# Patient Record
Sex: FEMALE | Race: BLACK OR AFRICAN AMERICAN | URBAN - METROPOLITAN AREA
[De-identification: names, ages, dates, MRNs, and addresses within clinical notes are randomized per-mention and may not be internally consistent; named-entity substitution may affect disease eponyms.]

---

## 2017-07-13 LAB
A-G RATIO,AGRAT: 1.1 RATIO (ref 1.1–2.6)
ALBUMIN SERPL-MCNC: 3.9 G/DL (ref 3.5–5)
ALP SERPL-CCNC: 105 U/L (ref 25–115)
ALT SERPL-CCNC: 16 U/L (ref 5–40)
ANION GAP SERPL CALC-SCNC: 15 MMOL/L
AST SERPL W P-5'-P-CCNC: 18 U/L (ref 10–37)
BILIRUB SERPL-MCNC: 0.3 MG/DL (ref 0.2–1.2)
BUN SERPL-MCNC: 8 MG/DL (ref 6–22)
CALCIUM SERPL-MCNC: 9.4 MG/DL (ref 8.4–10.5)
CHLORIDE SERPL-SCNC: 98 MMOL/L (ref 98–110)
CO2 SERPL-SCNC: 26 MMOL/L (ref 20–32)
CREAT SERPL-MCNC: 0.7 MG/DL (ref 0.5–1.2)
GFRAA, 66117: >60
GFRNA, 66118: >60
GLOBULIN,GLOB: 3.4 G/DL (ref 2–4)
GLUCOSE SERPL-MCNC: 95 MG/DL (ref 65–99)
IMMUNOGLOBULIN A, QN, SERUM, 001784: 356 MG/DL (ref 70–400)
IMMUNOGLOBULIN G, QN, SERUM, 001776: 1488 MG/DL (ref 700–1600)
IMMUNOGLOBULIN M, QN, SERUM, 001792: 127 MG/DL (ref 56–352)
LDH SERPL L TO P-CCNC: 249 U/L (ref 98–192)
PHOSPHATE SERPL-MCNC: 3.8 MG/DL (ref 2.4–4.7)
POTASSIUM SERPL-SCNC: 5 MMOL/L (ref 3.5–5.5)
PROT SERPL-MCNC: 7.3 G/DL (ref 6.4–8.3)
SODIUM SERPL-SCNC: 139 MMOL/L (ref 133–145)
URATE SERPL-MCNC: 6.6 MG/DL (ref 2.2–7.7)

## 2017-07-14 LAB
FREE KAPPA LT CHAINS,S, 121138: 33.3 MG/L
FREE LAMBDA LT CHAINS,S, 121139: 22.6 MG/L
KAPPA/LAMBDA RATIO,S, 121140: 1.47

## 2017-07-18 LAB
ALBUMIN, 001488: 46.7 % (ref 46.6–62.6)
ALBUMIN, 001488: 46.7 % (ref 46.6–62.6)
ALPHA-1-GLOBULIN, 001489: 2.7 % (ref 1.7–4.1)
ALPHA-1-GLOBULIN, 001489: 2.7 % (ref 1.7–4.1)
ALPHA-2-GLOBULIN, 001490: 11.3 % (ref 5.9–13.5)
ALPHA-2-GLOBULIN, 001490: 11.3 % (ref 5.9–13.5)
BETA GLOBULIN, 001491: 17 % (ref 10.9–18.9)
BETA GLOBULIN, 001491: 17 % (ref 10.9–18.9)
GAMMA GLOBULIN, 001492: 22.3 % (ref 11.6–24.4)
GAMMA GLOBULIN, 001492: 22.3 % (ref 11.6–24.4)
IMMUNOFIXATION RESULT, SERUM, 001795: NORMAL
PE INTERPRETATION, 107352: NORMAL
PROT SERPL-MCNC: 7.3 G/DL (ref 6.4–8.3)
PROT SERPL-MCNC: 7.3 G/DL (ref 6.4–8.3)

## 2019-03-21 ENCOUNTER — OFFICE VISIT (OUTPATIENT)
Dept: ONCOLOGY | Age: 59
End: 2019-03-21

## 2019-03-21 ENCOUNTER — HOSPITAL ENCOUNTER (OUTPATIENT)
Dept: ONCOLOGY | Age: 59
Discharge: HOME | End: 2019-03-21

## 2019-03-21 VITALS
TEMPERATURE: 98.2 F | HEIGHT: 64 IN | SYSTOLIC BLOOD PRESSURE: 112 MMHG | HEART RATE: 93 BPM | WEIGHT: 233 LBS | BODY MASS INDEX: 39.78 KG/M2 | OXYGEN SATURATION: 100 % | DIASTOLIC BLOOD PRESSURE: 74 MMHG | RESPIRATION RATE: 16 BRPM

## 2019-03-21 DIAGNOSIS — D89.2 HYPERGAMMAGLOBULINEMIA: ICD-10-CM

## 2019-03-21 DIAGNOSIS — D89.2 HYPERGAMMAGLOBULINEMIA: Primary | ICD-10-CM

## 2019-03-21 LAB
BASO+EOS+MONOS # BLD AUTO: 0.5 K/UL (ref 0–2.3)
BASO+EOS+MONOS NFR BLD AUTO: 10 % (ref 0.1–17)
DIFFERENTIAL METHOD BLD: NORMAL
ERYTHROCYTE [DISTWIDTH] IN BLOOD BY AUTOMATED COUNT: 13.2 % (ref 11.5–14.5)
HCT VFR BLD AUTO: 40.5 % (ref 36–48)
HGB BLD-MCNC: 13.2 G/DL (ref 12–16)
LYMPHOCYTES # BLD: 1.9 K/UL (ref 1.1–5.9)
LYMPHOCYTES NFR BLD: 42 % (ref 14–44)
MCH RBC QN AUTO: 27.1 PG (ref 25–35)
MCHC RBC AUTO-ENTMCNC: 32.6 G/DL (ref 31–37)
MCV RBC AUTO: 83.2 FL (ref 78–102)
NEUTS SEG # BLD: 2.2 K/UL (ref 1.8–9.5)
NEUTS SEG NFR BLD: 48 % (ref 40–70)
PLATELET # BLD AUTO: 254 K/UL (ref 140–440)
RBC # BLD AUTO: 4.87 M/UL (ref 4.1–5.1)
WBC # BLD AUTO: 4.6 K/UL (ref 4.5–13)

## 2019-03-21 RX ORDER — PHENTERMINE HYDROCHLORIDE 37.5 MG/1
37.5 CAPSULE ORAL
COMMUNITY

## 2019-03-21 RX ORDER — LOSARTAN POTASSIUM AND HYDROCHLOROTHIAZIDE 25; 100 MG/1; MG/1
1 TABLET ORAL DAILY
COMMUNITY

## 2019-03-21 NOTE — PROGRESS NOTES
Hematology/Oncology Consultation Note Name: Gayle Jauregui 
Date: 3/21/2019 : 1960 No primary care provider on file. Ms. Royal De La Rosa  is a 63 y.o. -American woman who is here for an assessment to rule out multiple myeloma. She has a strong positive family history of myeloma involving her father and brother. Subjective: Chief complaint: Anxiety over health. The patient has a strong positive family history for multiple myeloma. History of present illness: Ms. Royal De La Rosa is a 81-year-old -American woman who underwent an extensive evaluation in 2017 to rule out any evidence of malignancy. She had some bone pain with potential abnormal bone findings. Subsequently she had x-rays of her knee which showed some lytic lesions in both femurs with one lesion measuring 2.8 x 1.3 cm and it was described as a sclerotic lesion in the super lateral condyle. 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 On 2017 the patient underwent a whole-body bone scan which revealed discrete activity in the right femoral metaphysis corresponding to the sclerotic lesion on the prior right knee radiograph. A solitary sclerotic lesion on a bone scan was nonspecific. Subsequently on 2017 she had MRI of both knees which revealed subchondral cystic changes with edema and potential osteochondral fractures at the anterior distal femur. On the same day of CT scan of the chest abdomen pelvis showed some lytic lesions in the manubrium which were on clear as to the etiology. There was an enlarged left thyroid nodule measuring 2.1 cm which was subsequently biopsied and found to be negative for malignancy but to contain follicular cells, lymphocyte, and Hurthle cells but this was consistent with a lymphocytic thyroiditis.   An MRI was done of the chest on 2017 revealed a nonaggressive T1 lesion within the manubrium which was of unclear etiology. The imaging features were most consistent with hemangioma. On 10/5/2017 a CT-guided fine-needle aspiration of the manubrial lytic bone lesion was negative for malignancy. In 2013 the patient has undergone right thyroidectomy and on 9/29/2017 a biopsy of the left thyroid gland had been planned. On 10/5/2017 a CT-guided core biopsy of the right thyroid fossa mass was negative for malignancy. Temperature done on 10/5/2017 on aspiration from the left thyroid revealed no definitive immunophenotypic evidence of lymphoproliferative disorder. The patient reports that she does have a strong positive family history for multiple myeloma in her father and now in 3 of her brothers. She is concerned and therefore came here for additional testing to have rule out any evidence that she may be developing multiple myeloma or a pre-myeloma process. Past Medical History:  
Diagnosis Date  Gall bladder pain  Hypertension  Sinus problem  Wears dentures Allergies no known allergies Past Surgical History:  
Procedure Laterality Date  HX GYN    
 HX PARTIAL THYROIDECTOMY Social History Socioeconomic History  Marital status:  Spouse name: Not on file  Number of children: Not on file  Years of education: Not on file  Highest education level: Not on file Occupational History  Not on file Social Needs  Financial resource strain: Not on file  Food insecurity:  
  Worry: Not on file Inability: Not on file  Transportation needs:  
  Medical: Not on file Non-medical: Not on file Tobacco Use  Smoking status: Never Smoker  Smokeless tobacco: Never Used Substance and Sexual Activity  Alcohol use: Not Currently  Drug use: Never  Sexual activity: Not Currently Lifestyle  Physical activity:  
  Days per week: Not on file Minutes per session: Not on file  Stress: Not on file Relationships  Social connections:  
  Talks on phone: Not on file Gets together: Not on file Attends [employer] service: Not on file Active member of club or organization: Not on file Attends meetings of clubs or organizations: Not on file Relationship status: Not on file  Intimate partner violence:  
  Fear of current or ex partner: Not on file Emotionally abused: Not on file Physically abused: Not on file Forced sexual activity: Not on file Other Topics Concern  Not on file Social History Narrative  Not on file Family History Problem Relation Age of Onset  Cancer Mother   
     breast  
 Hypertension Mother  Cancer Father   
     multiple mylemoa  Heart Disease Father  Hypertension Father Current Outpatient Medications Medication Sig Dispense Refill  loratadine (CLARITIN PO) Take  by mouth.  soy isofla/blk cohosh/mag bark (ESTROVEN PO) Take  by mouth.  losartan-hydroCHLOROthiazide (HYZAAR) 100-25 mg per tablet Take 1 Tab by mouth daily.  phentermine 37.5 mg capsule Take 37.5 mg by mouth every morning. Review of Systems General ROS:The patient has no complaints and there is no physical distress evident. Psychological ROS: patient denies having any psychological symptoms such as hallucinations or depression. However she has a considerable degree of anxiety over health and is concerned that she may have multiple since its positive and her brother and father. Ophthalmic ROS:the patient denies having any visual impairment or eye discomfort. ENT ROS: there are no abnormalities reported. Allergy and Immunology ROS:the patient denies having any seasonal allergies or allergies to medications other than those already outlined above. Hematological and Lymphatic ROS: the patient denies having any bruising, bleeding or lymphadenopathy. Endocrine ROS: the patient denies having any heat or cold intolerance. There is no history of diabetes or thyroid disorders. Breast ROS: the patient denies having any history of breast mass, nipple discharge, or lumps. Respiratory ROS:the patient denies having any cough, shortness of breath, or dyspnea on exertion. Cardiovascular ROS: there are no complaints of chest pain, palpitations, chest pounding, or dyspnea on exertion. Gastrointestinal ROS: the patient denies having nausea, emesis, diarrhea, constipation, or blood in the stool. Genito-Urinary ROS: the patient denies having urinary urgency, frequency, or dysuria. Musculoskeletal ROS: with the exception of mild arthralgias the patient has no other musculoskeletal complaints. Neurological ROS: the patient denies having any numbness, tingling, or neurologic deficits. Dermatological ROS:patient denies having any unexplained rash, skin ulcerations, or hives. Objective:  
 
Visit Vitals /74 Pulse 93 Temp 98.2 °F (36.8 °C) (Oral) Resp 16 Ht 5' 3.75\" (1.619 m) Wt 105.7 kg (233 lb) SpO2 100% BMI 40.31 kg/m² ECOGPS=0; pain score=0/10 Physical Exam:  
Gen. Appearance: the patient is in no acute distress. Skin: There is no evidence of bruise or rash. HEENT: The head is normocephalic and atraumatic. The conjunctiva and sclera are clear. Pupils are equal, round, reactive to light, and accommodation. The extraocular movements are intact. ENT reveals no oral mucosal lesions or ulcerations. Neck: Supple without lymphadenopathy or thyromegaly. Lungs: Clear to auscultation and percussion; there are no wheezes or rhonchi. Heart: Regular rate and rhythm; there are no murmurs, gallops, or rubs. Abdomen: Bowel sounds are present and normal.  There is no guarding, tenderness, or hepatosplenomegaly. Extremities: There is no clubbing, cyanosis, or edema. Neurologic: There are no focal neurologic deficits. Lymphatics: There is no palpable peripheral lymphadenopathy. Lab data: Lab data with regards to x-ray results and biopsies are outlined in the above history and will not be repeated again here. Lab test from 6/30/2017 included a serum protein electrophoresis which revealed no evidence of a monoclonal paraprotein. Serum free light chain showed slight elevation in kappa light chains at 37 mg/dL with normal lambda light chains and a normal free kappa/lambda ratio. Quantitative immunoglobulins revealed an elevated IgG level of 1729 mg/dL. The IgA was normal at 374 mg/dL, and the IgM was normal at 131 mg/dL. Assessment:  
Multiple lytic bone lesions, the patient is concerned of plasma cell dyscrasia: I have explained to the patient that with her positive family history we will assess for any evidence of plasma cell dyscrasia. Plan:  
Multiple bone lesions on prior x-rays with negative biopsy: At this time I will order a compressive metabolic panel, CBC, SPEP, serum immunofixation, serum free light chain assay, and beta-2 microglobulin level. If there is any evidence of a monoclonal paraprotein level detected then the patient will be subjected to additional testing which may include a bone marrow biopsy and 24-hour urine collection. I will plan to see her back in clinic in about 2-3 weeks to review lab data and to discuss if additional tests will be recommended. Orders Placed This Encounter  METABOLIC PANEL, COMPREHENSIVE Standing Status:   Future Standing Expiration Date:   3/21/2020  FREE LIGHT CHAINS, KAPPA/LAMBDA, QT Standing Status:   Future Standing Expiration Date:   3/21/2020  IMMUNOGLOBULINS, G/A/M, QT. Standing Status:   Future Standing Expiration Date:   3/21/2020  SPEP Standing Status:   Future Standing Expiration Date:   3/21/2020  BETA-2 MICROGLOBULIN Standing Status:   Future Standing Expiration Date:   3/21/2020  IMMUNOELECTROPHORESIS (IMMUNOFIX.) Standing Status:   Future Standing Expiration Date:   3/21/2020  loratadine (CLARITIN PO) Sig: Take  by mouth.  soy isofla/blk cohosh/mag bark (ESTROVEN PO) Sig: Take  by mouth.  losartan-hydroCHLOROthiazide (HYZAAR) 100-25 mg per tablet Sig: Take 1 Tab by mouth daily.  phentermine 37.5 mg capsule Sig: Take 37.5 mg by mouth every morning. Irina Colin MD 
3/21/2019 Please note: This document has been produced using voice recognition software. Unrecognized errors in transcription may be present.

## 2019-03-21 NOTE — PATIENT INSTRUCTIONS
Learning About Abnormal Lab Results Your Care Instructions Your lab test result is only one piece of information about your health. Your doctor considers many things when looking at a test result. These things may include your symptoms, age, weight, physical exam, and family history. That's why it's important to talk to your doctor. He or she can give you a clear sense of what your result means for you. Before you do, you may also find it helpful to learn a little about lab results in general. 
What can help you understand your test result? Lab test results are expressed in different ways. A test result can be: · Positive when something is present. One example is the hormone that is a sign of pregnancy. · Negative when something isn't present. An example is a negative strep test. 
· Inside or outside of the reference range of what is most common for that test. A reference range is just a guide. It is set by testing large groups of healthy people. It's also possible for a test result to be false-positive or false-negative. · A false-positive result is one that appears to detect something when in fact it is not present. · A false-negative result is one that does not detect what is being tested for even though it is present. What if your result is different than the reference range? It is possible to have a result that is outside the reference range even though nothing is wrong with you. Your doctor may want to repeat the test or order another test to check. Sometimes certain things can affect your test results. Examples include: · Pregnancy. · A medicine you are taking. · Fasting or eating just before a test. 
· Smoking. · Being under stress. Making sense of your lab test involves more than just knowing the numbers. Your doctor can tell you what your test results mean for you and your health. Follow-up care is a key part of your treatment and safety.  Be sure to make and go to all appointments, and call your doctor if you are having problems. It's also a good idea to know your test results and keep a list of the medicines you take. Where can you learn more? Go to http://feliciano-minnie.info/. Enter F140 in the search box to learn more about \"Learning About Abnormal Lab Results. \" Current as of: June 25, 2018 Content Version: 11.9 © 3686-0491 [employer]. Care instructions adapted under license by [employer] (which disclaims liability or warranty for this information). If you have questions about a medical condition or this instruction, always ask your healthcare professional. [address] any warranty or liability for your use of this information.

## 2019-03-22 LAB
A-G RATIO,AGRAT: 1.3 RATIO (ref 1.1–2.6)
ALBUMIN SERPL-MCNC: 4.4 G/DL (ref 3.5–5)
ALBUMIN, 001488: 47.6 % (ref 46.6–62.6)
ALBUMIN, 001488: 47.6 % (ref 46.6–62.6)
ALP SERPL-CCNC: 136 U/L (ref 25–115)
ALPHA-1-GLOBULIN, 001489: 2.5 % (ref 1.7–4.1)
ALPHA-1-GLOBULIN, 001489: 2.5 % (ref 1.7–4.1)
ALPHA-2-GLOBULIN, 001490: 9.6 % (ref 5.9–13.5)
ALPHA-2-GLOBULIN, 001490: 9.6 % (ref 5.9–13.5)
ALT SERPL-CCNC: 18 U/L (ref 5–40)
ANION GAP SERPL CALC-SCNC: 14 MMOL/L
AST SERPL W P-5'-P-CCNC: 17 U/L (ref 10–37)
BETA GLOBULIN, 001491: 18.5 % (ref 10.9–18.9)
BETA GLOBULIN, 001491: 18.5 % (ref 10.9–18.9)
BILIRUB SERPL-MCNC: 0.2 MG/DL (ref 0.2–1.2)
BUN SERPL-MCNC: 12 MG/DL (ref 6–22)
CALCIUM SERPL-MCNC: 9.4 MG/DL (ref 8.4–10.5)
CHLORIDE SERPL-SCNC: 102 MMOL/L (ref 98–110)
CO2 SERPL-SCNC: 28 MMOL/L (ref 20–32)
CREAT SERPL-MCNC: 0.8 MG/DL (ref 0.5–1.2)
GAMMA GLOBULIN, 001492: 21.9 % (ref 11.6–24.4)
GAMMA GLOBULIN, 001492: 21.9 % (ref 11.6–24.4)
GFRAA, 66117: >60
GFRNA, 66118: >60
GLOBULIN,GLOB: 3.5 G/DL (ref 2–4)
GLUCOSE SERPL-MCNC: 81 MG/DL (ref 70–99)
IMMUNOFIXATION RESULT, SERUM, 001795: NORMAL
IMMUNOGLOBULIN A, QN, SERUM, 001784: 400 MG/DL (ref 70–400)
IMMUNOGLOBULIN G, QN, SERUM, 001776: 1565 MG/DL
IMMUNOGLOBULIN M, QN, SERUM, 001792: 143 MG/DL (ref 56–352)
PE INTERPRETATION, 107352: NORMAL
POTASSIUM SERPL-SCNC: 3.9 MMOL/L (ref 3.5–5.5)
PROT SERPL-MCNC: 7.2 G/DL (ref 6.4–8.3)
PROT SERPL-MCNC: 7.2 G/DL (ref 6.4–8.3)
PROT SERPL-MCNC: 7.9 G/DL (ref 6.4–8.3)
SODIUM SERPL-SCNC: 144 MMOL/L (ref 133–145)

## 2019-03-23 LAB — B2 MICROGLOB SERPL-MCNC: 1.9 MG/L (ref 0.6–2.4)

## 2019-03-25 LAB
FREE KAPPA LT CHAINS,S, 121138: 36.6 MG/L (ref 3.3–19.4)
FREE LAMBDA LT CHAINS,S, 121139: 21.6 MG/L (ref 5.7–26.3)
KAPPA/LAMBDA RATIO,S, 121140: 1.69 (ref 0.26–1.65)

## 2019-04-11 ENCOUNTER — OFFICE VISIT (OUTPATIENT)
Dept: ONCOLOGY | Age: 59
End: 2019-04-11

## 2019-04-11 VITALS
HEART RATE: 85 BPM | DIASTOLIC BLOOD PRESSURE: 89 MMHG | WEIGHT: 241.4 LBS | BODY MASS INDEX: 41.21 KG/M2 | TEMPERATURE: 98.1 F | SYSTOLIC BLOOD PRESSURE: 138 MMHG | OXYGEN SATURATION: 94 % | HEIGHT: 64 IN

## 2019-04-11 DIAGNOSIS — D89.2 HYPERGAMMAGLOBULINEMIA: Primary | ICD-10-CM

## 2019-04-11 NOTE — PROGRESS NOTES
Hematology/medical oncology progress note    4/11/2019  Sandra Jauregui  YOB: 1960    Diagnosis: Monoclonal gammopathy of undetermined significance. I have explained to Mrs. Jauregui that her serum protein electrophoresis and immunofixation failed to show any evidence of a monoclonal paraprotein level. The beta-2 microglobulin level was normal.  Immunoglobulin levels including IgG, IgA, IgM. The comprehensive metabolic panel revealed all normal values. The serum free light chain assay showed free kappa light chains at 36.6 mg/L and free lambda light chains at 21.6 mg/L. There was no evidence of plasma cell dyscrasia. No further evaluation is warranted. The patient had her questions answered to her satisfaction. Total time 20 minutes, greater than 50% of the time was in counseling and coordination of care. She will follow-up in the clinic in the future on a as needed basis. Deon Robison MD, [address]

## 2019-04-11 NOTE — PATIENT INSTRUCTIONS
Learning About Abnormal Lab Results  Your Care Instructions    Your lab test result is only one piece of information about your health. Your doctor considers many things when looking at a test result. These things may include your symptoms, age, weight, physical exam, and family history. That's why it's important to talk to your doctor. He or she can give you a clear sense of what your result means for you. Before you do, you may also find it helpful to learn a little about lab results in general.  What can help you understand your test result? Lab test results are expressed in different ways. A test result can be:  · Positive when something is present. One example is the hormone that is a sign of pregnancy. · Negative when something isn't present. An example is a negative strep test.  · Inside or outside of the reference range of what is most common for that test. A reference range is just a guide. It is set by testing large groups of healthy people. It's also possible for a test result to be false-positive or false-negative. · A false-positive result is one that appears to detect something when in fact it is not present. · A false-negative result is one that does not detect what is being tested for even though it is present. What if your result is different than the reference range? It is possible to have a result that is outside the reference range even though nothing is wrong with you. Your doctor may want to repeat the test or order another test to check. Sometimes certain things can affect your test results. Examples include:  · Pregnancy. · A medicine you are taking. · Fasting or eating just before a test.  · Smoking. · Being under stress. Making sense of your lab test involves more than just knowing the numbers. Your doctor can tell you what your test results mean for you and your health. Follow-up care is a key part of your treatment and safety.  Be sure to make and go to all appointments, and call your doctor if you are having problems. It's also a good idea to know your test results and keep a list of the medicines you take. Where can you learn more? Go to http://feliciano-minnie.info/. Enter F140 in the search box to learn more about \"Learning About Abnormal Lab Results. \"  Current as of: June 25, 2018  Content Version: 11.9  © 7625-6184 [employer]. Care instructions adapted under license by [employer] (which disclaims liability or warranty for this information). If you have questions about a medical condition or this instruction, always ask your healthcare professional. [address] any warranty or liability for your use of this information.